# Patient Record
Sex: MALE | Race: OTHER | Employment: FULL TIME | URBAN - METROPOLITAN AREA
[De-identification: names, ages, dates, MRNs, and addresses within clinical notes are randomized per-mention and may not be internally consistent; named-entity substitution may affect disease eponyms.]

---

## 2024-04-16 ENCOUNTER — HOSPITAL ENCOUNTER (OUTPATIENT)
Age: 24
Discharge: HOME | End: 2024-04-16

## 2024-04-16 VITALS
RESPIRATION RATE: 18 BRPM | DIASTOLIC BLOOD PRESSURE: 63 MMHG | HEART RATE: 52 BPM | OXYGEN SATURATION: 98 % | SYSTOLIC BLOOD PRESSURE: 125 MMHG | TEMPERATURE: 98 F

## 2024-04-16 DIAGNOSIS — N48.9 LESION OF PENIS: Primary | ICD-10-CM

## 2024-04-16 DIAGNOSIS — N48.1 BALANITIS: ICD-10-CM

## 2024-04-16 LAB — T PALLIDUM AB SER QL IA: NONREACTIVE

## 2024-04-16 PROCEDURE — 86780 TREPONEMA PALLIDUM: CPT | Performed by: NURSE PRACTITIONER

## 2024-04-16 PROCEDURE — 87491 CHLMYD TRACH DNA AMP PROBE: CPT | Performed by: NURSE PRACTITIONER

## 2024-04-16 PROCEDURE — 87591 N.GONORRHOEAE DNA AMP PROB: CPT | Performed by: NURSE PRACTITIONER

## 2024-04-16 PROCEDURE — 87661 TRICHOMONAS VAGINALIS AMPLIF: CPT | Performed by: NURSE PRACTITIONER

## 2024-04-16 PROCEDURE — 87102 FUNGUS ISOLATION CULTURE: CPT | Performed by: NURSE PRACTITIONER

## 2024-04-16 PROCEDURE — 87206 SMEAR FLUORESCENT/ACID STAI: CPT | Performed by: NURSE PRACTITIONER

## 2024-04-16 PROCEDURE — 99203 OFFICE O/P NEW LOW 30 MIN: CPT | Performed by: NURSE PRACTITIONER

## 2024-04-16 RX ORDER — CLOTRIMAZOLE 1 %
1 CREAM (GRAM) TOPICAL 2 TIMES DAILY
Qty: 28 G | Refills: 0 | Status: SHIPPED | OUTPATIENT
Start: 2024-04-16 | End: 2024-04-23

## 2024-04-17 LAB
C TRACH DNA SPEC QL NAA+PROBE: NEGATIVE
N GONORRHOEA DNA SPEC QL NAA+PROBE: NEGATIVE

## 2024-04-17 NOTE — DISCHARGE INSTRUCTIONS
Std cultures are pending and results will be available in about 48 hours.  We will call you with any positive results and discuss the plan of care.  Use both creams as directed.  Alternate application, do not apply them at the same time.  Avoid trauma to the penis.  Avoid sexual activity until all tests are resulted and symptoms are resolved.  Schedule follow-up with your primary doctor if no improvement

## 2024-04-17 NOTE — ED PROVIDER NOTES
Patient Seen in: [facility]      History     Chief Complaint   Patient presents with    Lesion     Stated Complaint: std    Subjective:   23-year-old male with no past medical history presents from home.  Patient is here for evaluation of a lesion on his penis.  Initially noticed it about 1 year ago.  Has been coming and going.  Now has been more persistent over the last 2 days.  Has noticed some blood when cleaning under his foreskin.  Noticed 2 blisters under the foreskin.  States it hurts to urinate.  Denies drainage from the penis.  Concerned about STD but states no recent unprotected sex.  No previous history of STD.  He is not circumcised.  No history of balanitis.    The history is provided by the patient. No  was used.       Objective:   No pertinent past medical history.            No pertinent past surgical history.              No pertinent social history.            Review of Systems    Positive for stated complaint: std  Other systems are as noted in HPI.  Constitutional and vital signs reviewed.      All other systems reviewed and negative except as noted above.    Physical Exam     ED Triage Vitals [04/16/24 1837]   /63   Pulse 52   Resp 18   Temp 97.6 °F (36.4 °C)   Temp src Temporal   SpO2 98 %   O2 Device None (Room air)       Current:/63   Pulse 52   Temp 97.6 °F (36.4 °C) (Temporal)   Resp 18   SpO2 98%         Physical Exam  Vitals and nursing note reviewed. Exam conducted with a chaperone present (Gypsy MEDRANO).   Constitutional:       General: He is not in acute distress.     Appearance: Normal appearance. He is not ill-appearing or toxic-appearing.   HENT:      Head: Normocephalic and atraumatic.      Nose: Nose normal.      Mouth/Throat:      Mouth: Mucous membranes are moist.      Pharynx: Oropharynx is clear.   Eyes:      Pupils: Pupils are equal, round, and reactive to light.   Cardiovascular:      Rate and Rhythm: Normal rate and regular  rhythm.      Pulses: Normal pulses.   Pulmonary:      Effort: Pulmonary effort is normal. No respiratory distress.      Breath sounds: Normal breath sounds.      Comments: Lungs clear.  No adventitious lung sounds.  No distress.  No hypoxia.  Pulse ox 98% ra. Which is normal    Abdominal:      General: Abdomen is flat.      Palpations: Abdomen is soft.      Tenderness: There is no abdominal tenderness.      Comments: No hernia   Genitourinary:     Penis: Uncircumcised. Erythema, tenderness, discharge and lesions present.       Testes: Normal.         Right: Mass, tenderness or swelling not present.         Left: Mass, tenderness or swelling not present.      Comments: 2 1 cm round lesions - one on each side of the shaft under the foreskin. Small amount of yellow drainage. Foul odor. Tender to touch.  Erythematous. No phimosis or paraphimosis.  Musculoskeletal:         General: No signs of injury. Normal range of motion.      Cervical back: Normal range of motion and neck supple.   Skin:     General: Skin is warm and dry.      Capillary Refill: Capillary refill takes less than 2 seconds.   Neurological:      General: No focal deficit present.      Mental Status: He is alert and oriented to person, place, and time.      GCS: GCS eye subscore is 4. GCS verbal subscore is 5. GCS motor subscore is 6.   Psychiatric:         Mood and Affect: Mood normal.         Behavior: Behavior normal.         Thought Content: Thought content normal.         Judgment: Judgment normal.           ED Course     Labs Reviewed   TRICH VAG BY LEANN   T PALLIDUM SCREENING CASCADE   CHLAMYDIA/GONOCOCCUS, LEANN   FUNGUS CULTURE AND SMEAR(INCLUDES STAIN)    Narrative:     The following orders were created for panel order Fungus Culture and Stain.  Procedure                               Abnormality         Status                     ---------                               -----------         ------                     FUNGUS CULTURE(P)[[id number]]                                 In process                 Fungus Stain[[id number]]                                     In process                   Please view results for these tests on the individual orders.   FUNGUS CULTURE(P)   FUNGUS STAIN         MDM        Medical Decision Making  Penile lesion  Differential diagnosis: Bacterial versus Candida balanitis, gonorrhea, chlamydia, trichomonas, syphilis  STD cultures were collected and sent to lab.  Prophylactic treatment was deferred at this time  Will treat balanitis.  Hygiene practices discussed.  Clotrimazole and mupirocin sent to pharmacy.  Safe sex practices discussed.  Stressed to patient no sexual activity until all results are available and symptoms have resolved    Results and plan of care discussed with the patient/family. They are in agreement with discharge. They understand to follow up with their primary doctor or the referral physician for further evaluation, especially if no improvement.  Also discussed the limitations of immediate care, patient is aware that if symptoms are worse they should go to the emergency room. Verbal and written discharge instructions were given.           Disposition and Plan     Clinical Impression:  1. Lesion of penis    2. Balanitis         Disposition:  Discharge  4/16/2024  7:27 pm    Follow-up:  Abraham Woodard, DO  [address] [place] [address]  [phone]                Medications Prescribed:  Current Discharge Medication List        START taking these medications    Details   clotrimazole 1 % External Cream Apply 1 Application topically 2 (two) times daily for 7 days.  Qty: 28 g, Refills: 0      mupirocin 2 % External Ointment Apply 1 Application topically 3 (three) times daily for 7 days.  Qty: 22 g, Refills: 0

## 2024-04-19 LAB — TRICH VAG NAA: NEGATIVE
